# Patient Record
Sex: FEMALE | Race: WHITE | NOT HISPANIC OR LATINO | URBAN - METROPOLITAN AREA
[De-identification: names, ages, dates, MRNs, and addresses within clinical notes are randomized per-mention and may not be internally consistent; named-entity substitution may affect disease eponyms.]

---

## 2021-02-01 ENCOUNTER — OFFICE (OUTPATIENT)
Dept: URBAN - METROPOLITAN AREA CLINIC 8 | Facility: CLINIC | Age: 71
End: 2021-02-01

## 2021-02-01 VITALS
DIASTOLIC BLOOD PRESSURE: 79 MMHG | OXYGEN SATURATION: 96 % | HEART RATE: 70 BPM | SYSTOLIC BLOOD PRESSURE: 121 MMHG | WEIGHT: 147 LBS | HEIGHT: 57 IN

## 2021-02-01 DIAGNOSIS — R10.13 EPIGASTRIC PAIN: ICD-10-CM

## 2021-02-01 DIAGNOSIS — K21.9 GASTRO-ESOPHAGEAL REFLUX DISEASE WITHOUT ESOPHAGITIS: ICD-10-CM

## 2021-02-01 DIAGNOSIS — R74.01 ELEVATION OF LEVELS OF LIVER TRANSAMINASE LEVELS: ICD-10-CM

## 2021-02-01 DIAGNOSIS — K52.89 OTHER SPECIFIED NONINFECTIVE GASTROENTERITIS AND COLITIS: ICD-10-CM

## 2021-02-01 DIAGNOSIS — R14.2 ERUCTATION: ICD-10-CM

## 2021-02-01 LAB
ACTIN (SMOOTH MUSCLE) ANTIBODY: 6 UNITS (ref 0–19)
ANTINUCLEAR ANTIBODIES, IFA: NEGATIVE
CERULOPLASMIN: 29.7 MG/DL (ref 19–39)
COPPER, SERUM: 140 UG/DL (ref 80–158)
FERRITIN, SERUM: 9 NG/ML — LOW (ref 15–150)
HBSAG SCREEN: NEGATIVE
HCV ANTIBODY: HEP C VIRUS AB: <0.1 S/CO RATIO
HEPATIC FUNCTION PANEL (7): ALBUMIN: 3.9 G/DL (ref 3.8–4.8)
HEPATIC FUNCTION PANEL (7): ALKALINE PHOSPHATASE: 125 IU/L — HIGH (ref 39–117)
HEPATIC FUNCTION PANEL (7): ALT (SGPT): 23 IU/L (ref 0–32)
HEPATIC FUNCTION PANEL (7): AST (SGOT): 26 IU/L (ref 0–40)
HEPATIC FUNCTION PANEL (7): BILIRUBIN, DIRECT: 0.11 MG/DL (ref 0–0.4)
HEPATIC FUNCTION PANEL (7): BILIRUBIN, TOTAL: 0.3 MG/DL (ref 0–1.2)
HEPATIC FUNCTION PANEL (7): PROTEIN, TOTAL: 6.6 G/DL (ref 6–8.5)

## 2021-02-01 PROCEDURE — 99204 OFFICE O/P NEW MOD 45 MIN: CPT | Performed by: NURSE PRACTITIONER

## 2021-02-01 RX ORDER — POLYETHYLENE GLYCOL 3350, SODIUM SULFATE, SODIUM CHLORIDE, POTASSIUM CHLORIDE, ASCORBIC ACID, SODIUM ASCORBATE 140-9-5.2G
KIT ORAL
Qty: 1 | Refills: 0 | Status: COMPLETED
Start: 2021-02-01 | End: 2024-05-21

## 2021-02-01 NOTE — SERVICENOTES
Pt seen and examined independently by Dr. Liu who discussed patient and developed impression and plan with SHRUTHI Rojas

## 2021-02-01 NOTE — SERVICEHPINOTES
She notes that she has an increase in belching. She notes that this is chronic for several years. She is taking Omeprazole 20 mg BID. She notes that she has reflux daily. She notes that it is worse at night when she is laying down. She denies retrosternal burning and dysphagia. She notes that she has epigastric pain that feels like trapped gas. She has early satiety. She denies regular use of NSAIDs. She denies melena. She notes that she has pain in the right side of her back. Pain has been present for 10-15 years. Pain is characterized as sharp. Pain is constant. Pain is worse with movement. Pain is improved with belching and when she has bowel movements. She notes that she has frequently changing stool habits. She notes that she has constipation one week and diarrhea the next. She notes that she has had diarrhea recently. She has 5-10 bowel movements per day. Stools are loose and brown. She denies hematochezia and melena. She denies any change in medication. She denies recent use of antibiotics. She notes she has had thyroid studies that are normal.She notes that she has a fatty enlarged liver. She notes that she has not had recent liver enzymes drawn.MD NOTE:  Here with excessive belching which has been present for years. Severity of belching is not modified by oral intake. She believes her belching really began after cholecystectomy. She has chronically alternating diarrhea and constipation which is unchanged over several years. She may have a one week period of diarrhea with 5-6 loose stools a day followed by week of hard stools. No particular foods tend to precipitate diarrhea. She denies any intolerant of dairy products. She has attempted bowel preparation colonoscopy in the past and developed vomiting.  AST mildly elevated last year.BR

## 2024-05-21 ENCOUNTER — OFFICE (OUTPATIENT)
Dept: URBAN - METROPOLITAN AREA CLINIC 11 | Facility: CLINIC | Age: 74
End: 2024-05-21

## 2024-05-21 VITALS
DIASTOLIC BLOOD PRESSURE: 64 MMHG | HEIGHT: 57 IN | SYSTOLIC BLOOD PRESSURE: 185 MMHG | HEART RATE: 73 BPM | WEIGHT: 148 LBS | OXYGEN SATURATION: 97 % | DIASTOLIC BLOOD PRESSURE: 61 MMHG | SYSTOLIC BLOOD PRESSURE: 157 MMHG

## 2024-05-21 DIAGNOSIS — R10.13 EPIGASTRIC PAIN: ICD-10-CM

## 2024-05-21 DIAGNOSIS — R74.01 ELEVATION OF LEVELS OF LIVER TRANSAMINASE LEVELS: ICD-10-CM

## 2024-05-21 DIAGNOSIS — R19.7 DIARRHEA, UNSPECIFIED: ICD-10-CM

## 2024-05-21 PROCEDURE — 99204 OFFICE O/P NEW MOD 45 MIN: CPT | Performed by: INTERNAL MEDICINE

## 2024-05-21 RX ORDER — SODIUM PICOSULFATE, MAGNESIUM OXIDE, AND ANHYDROUS CITRIC ACID 12; 3.5; 1 G/175ML; G/175ML; MG/175ML
LIQUID ORAL
Qty: 1 | Refills: 0 | Status: ACTIVE
Start: 2024-05-21